# Patient Record
Sex: MALE | Race: OTHER | HISPANIC OR LATINO | URBAN - METROPOLITAN AREA
[De-identification: names, ages, dates, MRNs, and addresses within clinical notes are randomized per-mention and may not be internally consistent; named-entity substitution may affect disease eponyms.]

---

## 2021-08-12 ENCOUNTER — OFFICE VISIT (OUTPATIENT)
Dept: URBAN - METROPOLITAN AREA CLINIC 98 | Facility: CLINIC | Age: 71
End: 2021-08-12
Payer: MEDICARE

## 2021-08-12 ENCOUNTER — WEB ENCOUNTER (OUTPATIENT)
Dept: URBAN - METROPOLITAN AREA CLINIC 98 | Facility: CLINIC | Age: 71
End: 2021-08-12

## 2021-08-12 DIAGNOSIS — Z86.010 PERSONAL HISTORY OF COLONIC POLYPS: ICD-10-CM

## 2021-08-12 PROCEDURE — 99243 OFF/OP CNSLTJ NEW/EST LOW 30: CPT | Performed by: INTERNAL MEDICINE

## 2021-08-12 PROCEDURE — 99203 OFFICE O/P NEW LOW 30 MIN: CPT | Performed by: INTERNAL MEDICINE

## 2021-08-12 RX ORDER — SODIUM SULFATE, MAGNESIUM SULFATE, AND POTASSIUM CHLORIDE 17.75; 2.7; 2.25 G/1; G/1; G/1
12 TABLETS TABLET ORAL
Qty: 24 TABLETS | Refills: 0 | OUTPATIENT
Start: 2021-08-12 | End: 2021-08-13

## 2021-08-12 NOTE — HPI-TODAY'S VISIT:
Patient referred by Geneva Carmona MD for surveilance colonoscopy. Copy of this consult OV sent to Dr. Carmona. 69 yo pt w/ PHx colon polyps due for surveillance colonoscopy. Has been doing well. No abdominal pain and no change in bowel pattern. No constitutional sxs. Normal labs a week ago. No COVID-19 exposure and has received 2 doses of CIVID-19 vaccine. He has a hx CKD-1, s/p pacemaker placement off Eliquis x 2 weeks. Denies cardiorespiratory sxs. NO anorexia or weight loss.

## 2021-08-13 ENCOUNTER — OFFICE VISIT (OUTPATIENT)
Dept: URBAN - METROPOLITAN AREA MEDICAL CENTER 28 | Facility: MEDICAL CENTER | Age: 71
End: 2021-08-13

## 2021-09-28 ENCOUNTER — TELEPHONE ENCOUNTER (OUTPATIENT)
Dept: URBAN - METROPOLITAN AREA CLINIC 40 | Facility: CLINIC | Age: 71
End: 2021-09-28

## 2021-09-30 ENCOUNTER — OFFICE VISIT (OUTPATIENT)
Dept: URBAN - METROPOLITAN AREA SURGERY CENTER 18 | Facility: SURGERY CENTER | Age: 71
End: 2021-09-30

## 2021-11-04 ENCOUNTER — OFFICE VISIT (OUTPATIENT)
Dept: URBAN - METROPOLITAN AREA MEDICAL CENTER 39 | Facility: MEDICAL CENTER | Age: 71
End: 2021-11-04
Payer: MEDICARE

## 2021-11-04 DIAGNOSIS — K63.89 BACTERIAL OVERGROWTH SYNDROME: ICD-10-CM

## 2021-11-04 DIAGNOSIS — Z86.010 ADENOMAS PERSONAL HISTORY OF COLONIC POLYPS: ICD-10-CM

## 2021-11-04 DIAGNOSIS — K63.5 BENIGN COLON POLYP: ICD-10-CM

## 2021-11-04 PROCEDURE — G8907 PT DOC NO EVENTS ON DISCHARG: HCPCS | Performed by: INTERNAL MEDICINE

## 2021-11-04 PROCEDURE — 45380 COLONOSCOPY AND BIOPSY: CPT | Performed by: INTERNAL MEDICINE

## 2022-11-03 ENCOUNTER — OFFICE VISIT (OUTPATIENT)
Dept: URBAN - METROPOLITAN AREA CLINIC 98 | Facility: CLINIC | Age: 72
End: 2022-11-03
Payer: MEDICARE

## 2022-11-03 VITALS
HEART RATE: 71 BPM | TEMPERATURE: 97.8 F | WEIGHT: 200 LBS | SYSTOLIC BLOOD PRESSURE: 116 MMHG | DIASTOLIC BLOOD PRESSURE: 66 MMHG | HEIGHT: 73 IN | BODY MASS INDEX: 26.51 KG/M2

## 2022-11-03 DIAGNOSIS — E66.9 OBESITY (BMI 30.0-34.9): ICD-10-CM

## 2022-11-03 DIAGNOSIS — R10.33 PERIUMBILICAL ABDOMINAL PAIN: ICD-10-CM

## 2022-11-03 DIAGNOSIS — Z79.01 ANTICOAGULANT LONG-TERM USE: ICD-10-CM

## 2022-11-03 DIAGNOSIS — Z86.010 PERSONAL HISTORY OF COLONIC POLYPS: ICD-10-CM

## 2022-11-03 DIAGNOSIS — K59.01 CONSTIPATION BY DELAYED COLONIC TRANSIT: ICD-10-CM

## 2022-11-03 DIAGNOSIS — K57.90 DIVERTICULOSIS: ICD-10-CM

## 2022-11-03 PROCEDURE — 99214 OFFICE O/P EST MOD 30 MIN: CPT | Performed by: INTERNAL MEDICINE

## 2022-11-03 NOTE — HPI-TODAY'S VISIT:
72 yo pt w/ PHx colon polyps, diverticulosis here for follow up. Today, he reports mid abdominal pain after he was lifting a heavy weight w local erythema in the periumbilical area. He also reports rather frequent constipation, w +++ straining at stools, w concomitant abdominal bloating and distention. Has been seen x 2 in the ER @ [facility] for above sxs; normal labs and A + CT scan wo acute findings. Eight mos ago similar abdominal pain while lifting weight w L-arm paresthesias; seen in the ER @ [facility]: normal cardiac evaluation Has been seen by Cardiology 6 mos ago: normal XTT / Echo.  He reports being on a very low fiber diet w adequate amount of po fluids. No anorexia or weight loss. No melenic stool nor hematochezia. Colonoscopy 11/21: AC polyp, L-diverticulosis and I + E Hrrds.  has a hx CKD-1, s/p pacemaker placement on Eliquis. Denies cardiorespiratory sxs. No COVID-19 and has received COVID-19 vaccine.

## 2022-12-01 ENCOUNTER — WEB ENCOUNTER (OUTPATIENT)
Dept: URBAN - METROPOLITAN AREA CLINIC 98 | Facility: CLINIC | Age: 72
End: 2022-12-01

## 2022-12-01 ENCOUNTER — DASHBOARD ENCOUNTERS (OUTPATIENT)
Age: 72
End: 2022-12-01

## 2022-12-01 ENCOUNTER — OFFICE VISIT (OUTPATIENT)
Dept: URBAN - METROPOLITAN AREA CLINIC 98 | Facility: CLINIC | Age: 72
End: 2022-12-01
Payer: MEDICARE

## 2022-12-01 VITALS
BODY MASS INDEX: 26.56 KG/M2 | WEIGHT: 200.4 LBS | DIASTOLIC BLOOD PRESSURE: 64 MMHG | SYSTOLIC BLOOD PRESSURE: 113 MMHG | TEMPERATURE: 97 F | HEIGHT: 73 IN | HEART RATE: 79 BPM

## 2022-12-01 DIAGNOSIS — Z86.010 PERSONAL HISTORY OF COLONIC POLYPS: ICD-10-CM

## 2022-12-01 DIAGNOSIS — Z79.01 ANTICOAGULANT LONG-TERM USE: ICD-10-CM

## 2022-12-01 DIAGNOSIS — K59.01 CONSTIPATION BY DELAYED COLONIC TRANSIT: ICD-10-CM

## 2022-12-01 DIAGNOSIS — E66.9 OBESITY (BMI 30.0-34.9): ICD-10-CM

## 2022-12-01 DIAGNOSIS — R10.33 PERIUMBILICAL ABDOMINAL PAIN: ICD-10-CM

## 2022-12-01 DIAGNOSIS — K57.90 DIVERTICULOSIS: ICD-10-CM

## 2022-12-01 PROBLEM — 397881000: Status: ACTIVE | Noted: 2022-11-03

## 2022-12-01 PROBLEM — 428283002: Status: ACTIVE | Noted: 2021-08-12

## 2022-12-01 PROBLEM — 443371000124107: Status: ACTIVE | Noted: 2022-11-04

## 2022-12-01 PROBLEM — 711150003: Status: ACTIVE | Noted: 2022-11-04

## 2022-12-01 PROBLEM — 35298007: Status: ACTIVE | Noted: 2022-11-03

## 2022-12-01 PROCEDURE — 99214 OFFICE O/P EST MOD 30 MIN: CPT | Performed by: INTERNAL MEDICINE

## 2022-12-01 NOTE — HPI-TODAY'S VISIT:
70 yo pt w/ PHx colon polyps, diverticulosis here for follow up. Today, he reports resolution of his  abdominal pain , having regular, formed bm's wo melenic stools and no bleeding. Good appetite, on a high fiber diet. OnMiralaz prn w good results.  Previous normal labs and A + CT scan wo acute findings. Normal cardiac evaluation recently.  Has been seen by Cardiology 6 mos ago: normal XTT / Echo.   Colonoscopy 11/21: AC polyp, L-diverticulosis and I + E Hrrds.  has a hx CKD-1, s/p pacemaker placement on Eliquis. Denies cardiorespiratory sxs. No COVID-19 and has received COVID-19 vaccine.